# Patient Record
Sex: FEMALE | Race: WHITE | NOT HISPANIC OR LATINO | Employment: FULL TIME | URBAN - METROPOLITAN AREA
[De-identification: names, ages, dates, MRNs, and addresses within clinical notes are randomized per-mention and may not be internally consistent; named-entity substitution may affect disease eponyms.]

---

## 2024-01-24 ENCOUNTER — TELEPHONE (OUTPATIENT)
Dept: OBSTETRICS AND GYNECOLOGY | Facility: CLINIC | Age: 29
End: 2024-01-24
Payer: COMMERCIAL

## 2024-01-24 NOTE — TELEPHONE ENCOUNTER
Adele,    Cannot do today due to her work schedule. But did say she could do tomorrow if you had the availability?

## 2024-01-24 NOTE — TELEPHONE ENCOUNTER
"Adele,     Patient was open to schedule with anyone but I wanted to ask you. She is hoping to be seen sooner than first available.   \"DEAD SKIN INSIDE IN THE LIPS, OUT SIDE VAGINA IS SWOLLEN RED. PATIENT THINKS IT'S POSSIBLE lichen sclerosus \" - noted from the [facility] for patient's need for an appointment.    Please advise if you think she needs to be seen sooner or if first available is appropriate.  Thanks Dorys    "

## 2024-01-25 ENCOUNTER — OFFICE VISIT (OUTPATIENT)
Dept: OBSTETRICS AND GYNECOLOGY | Facility: CLINIC | Age: 29
End: 2024-01-25
Payer: COMMERCIAL

## 2024-01-25 VITALS
BODY MASS INDEX: 19.54 KG/M2 | SYSTOLIC BLOOD PRESSURE: 112 MMHG | DIASTOLIC BLOOD PRESSURE: 84 MMHG | HEART RATE: 100 BPM | WEIGHT: 106.2 LBS | HEIGHT: 62 IN

## 2024-01-25 DIAGNOSIS — N89.8 VAGINAL LESION: Primary | ICD-10-CM

## 2024-01-25 DIAGNOSIS — Z30.011 ENCOUNTER FOR INITIAL PRESCRIPTION OF CONTRACEPTIVE PILLS: ICD-10-CM

## 2024-01-25 RX ORDER — LIDOCAINE HYDROCHLORIDE 20 MG/ML
JELLY TOPICAL AS NEEDED
Qty: 5 G | Refills: 3 | Status: CANCELLED | OUTPATIENT
Start: 2024-01-25 | End: 2024-01-30

## 2024-01-25 RX ORDER — VALACYCLOVIR HYDROCHLORIDE 1 G/1
1000 TABLET, FILM COATED ORAL DAILY
Qty: 5 TABLET | Refills: 0 | Status: CANCELLED | OUTPATIENT
Start: 2024-01-25

## 2024-01-25 RX ORDER — VALACYCLOVIR HYDROCHLORIDE 1 G/1
1000 TABLET, FILM COATED ORAL 2 TIMES DAILY
Qty: 20 TABLET | Refills: 0 | Status: SHIPPED | OUTPATIENT
Start: 2024-01-25

## 2024-01-25 RX ORDER — LIDOCAINE HYDROCHLORIDE 20 MG/ML
JELLY TOPICAL AS NEEDED
Qty: 5 G | Refills: 3 | Status: SHIPPED | OUTPATIENT
Start: 2024-01-25 | End: 2024-01-30

## 2024-01-25 RX ORDER — NORETHINDRONE ACETATE AND ETHINYL ESTRADIOL, ETHINYL ESTRADIOL AND FERROUS FUMARATE 1MG-10(24)
1 KIT ORAL DAILY
Qty: 84 TABLET | Refills: 2 | Status: SHIPPED | OUTPATIENT
Start: 2024-01-25

## 2024-01-25 RX ORDER — CEFTRIAXONE 500 MG/1
500 INJECTION, POWDER, FOR SOLUTION INTRAMUSCULAR; INTRAVENOUS ONCE
Start: 2024-01-25 | End: 2024-01-25

## 2024-01-25 NOTE — PROGRESS NOTES
"GYN EXAM    Chief Complaint   Patient presents with    Gynecologic Exam     New patient. Patient c/o vaginal irritation since 1/19/24. Patient also c/o right pelvic pain. Patient would like to discuss birth control options.        SUBJECTIVE:     Fannie is a 29 y.o. No obstetric history on file. who presents with complaints of vaginal discharge and lesions for the last couple of days. She also complains of right sided pelvic pain for the same period of time. She has a new partner that she slept with approximately 6 days ago. She describes the discharge as yellow and thick. Denies vaginal itching, odor, fever, any change in soaps or hygiene products, or douching. She does endorse practicing safe intercourse with the use of condoms. She is also looking to get back on her birth control pills today - she has taken Lo-Loestrin in the past with good result.     History reviewed. No pertinent past medical history.     Past Surgical History:   Procedure Laterality Date    WISDOM TOOTH EXTRACTION        Review of Systems   Genitourinary:  Positive for dysuria, genital sores, pelvic pain, vaginal discharge and vaginal pain.   All other systems reviewed and are negative.      OBJECTIVE:   Vitals:    01/25/24 1049   BP: 112/84   Pulse: 100   Weight: 48.2 kg (106 lb 3.2 oz)   Height: 157.5 cm (62\")        Physical Exam  Constitutional:       General: She is awake.      Appearance: Normal appearance. She is well-developed and well-groomed.   Genitourinary:            Vaginal discharge, erythema, tenderness and ulceration present.      No vaginal bleeding.   HENT:      Head: Normocephalic and atraumatic.   Pulmonary:      Effort: Pulmonary effort is normal.   Musculoskeletal:      Cervical back: Normal range of motion.   Lymphadenopathy:      Lower Body: Right inguinal adenopathy present. Left inguinal adenopathy present.   Neurological:      General: No focal deficit present.      Mental Status: She is alert and oriented to " person, place, and time.   Skin:     General: Skin is warm and dry.   Psychiatric:         Mood and Affect: Mood normal.         Behavior: Behavior normal. Behavior is cooperative.   Vitals reviewed.       ASSESSMENT/PLAN  Diagnoses and all orders for this visit:    1. Vaginal lesion (Primary)  -     valACYclovir (Valtrex) 1000 MG tablet; Take 1 tablet by mouth 2 (Two) Times a Day.  Dispense: 20 tablet; Refill: 0  -     Lidocaine HCl gel (XYLOCAINE) 2 % gel; Apply  topically to the appropriate area as directed As Needed for Mild Pain for up to 5 days. Use a thin layer on vulva as needed for pain.  Dispense: 5 g; Refill: 3  -     Genital Culture - Swab, Vagina  -     Mycoplasma / Ureaplasma Culture - Swab, Cervix  -     NuSwab VG+ & HSV  -     cefTRIAXone (ROCEPHIN) 500 MG injection; Inject 500 mg into the appropriate muscle as directed by prescriber 1 (One) Time for 1 dose.    2. Encounter for initial prescription of contraceptive pills  -     Norethin-Eth Estrad-Fe Biphas (Lo Loestrin Fe) 1 MG-10 MCG / 10 MCG tablet; Take 1 tablet by mouth Daily.  Dispense: 84 tablet; Refill: 2      STD screen today- desires - NuSwab., encouraged use of condoms.  Vaginal cultures obtained including genital culture, NuSwab and mycoplasma/ureaplasma.   Rx sent for valtrex & lidocaine gel for primary outbreak of HSV.  Given the appearance of her vaginal discharge, I ordered a one time injection of ceftriaxone to prevent PID.   Precautions discussed.   Encouraged condoms with IC, cotton only underwear, mild or no soaps, avoidance of douching or cm steaming.   Encouraged adding probiotic daily or vaginal boric acid suppositories.   Samples of lo-loestrin given to patient and Rx sent to her pharmacy - [facility] discussed.   She originally came in to do her annual today but given her symptoms I suggested that we get her current symptoms under control prior to doing her annual.     Return if symptoms worsen or fail to improve, for annual  physical.    I spent 15 minutes caring for Fannie on this date of service. This time includes time spent by me in the following activities: preparing for the visit, reviewing tests, obtaining and/or reviewing a separately obtained history, performing a medically appropriate examination and/or evaluation, counseling and educating the patient/family/caregiver, ordering medications, tests, or procedures, referring and communicating with other health care professionals, documenting information in the medical record, independently interpreting results and communicating that information with the patient/family/caregiver, and care coordination        Adele Alvarez CNM  1/25/2024  12:47 EST

## 2024-01-26 RX ORDER — CEFTRIAXONE 500 MG/1
500 INJECTION, POWDER, FOR SOLUTION INTRAMUSCULAR; INTRAVENOUS ONCE
Status: SHIPPED | OUTPATIENT
Start: 2024-01-25

## 2024-01-26 NOTE — PATIENT INSTRUCTIONS
Rocephin 500 mg given-right ventrogluteal IM.  Patient tolerated well with no reaction.Office supply  [facility]:  8788-8267-08  LOT  [id number]  EXP  11/30/25

## 2024-01-28 RX ORDER — FLUCONAZOLE 150 MG/1
150 TABLET ORAL
Qty: 2 TABLET | Refills: 0 | Status: SHIPPED | OUTPATIENT
Start: 2024-01-28

## 2024-01-28 RX ORDER — PENICILLIN V POTASSIUM 250 MG/1
250 TABLET ORAL 4 TIMES DAILY
Qty: 28 TABLET | Refills: 0 | Status: SHIPPED | OUTPATIENT
Start: 2024-01-28 | End: 2024-02-04

## 2024-01-29 ENCOUNTER — TELEPHONE (OUTPATIENT)
Dept: OBSTETRICS AND GYNECOLOGY | Facility: CLINIC | Age: 29
End: 2024-01-29
Payer: COMMERCIAL

## 2024-01-29 LAB
A VAGINAE DNA VAG QL NAA+PROBE: ABNORMAL SCORE
BACTERIA GENITAL AEROBE CULT: ABNORMAL
BACTERIA SPEC CULT: ABNORMAL
BACTERIA SPEC CULT: ABNORMAL
BVAB2 DNA VAG QL NAA+PROBE: ABNORMAL SCORE
C ALBICANS DNA VAG QL NAA+PROBE: NEGATIVE
C GLABRATA DNA VAG QL NAA+PROBE: NEGATIVE
C TRACH DNA VAG QL NAA+PROBE: NEGATIVE
HSV1 DNA SPEC QL NAA+PROBE: POSITIVE
HSV2 DNA SPEC QL NAA+PROBE: NEGATIVE
MEGA1 DNA VAG QL NAA+PROBE: ABNORMAL SCORE
N GONORRHOEA DNA VAG QL NAA+PROBE: NEGATIVE
T VAGINALIS DNA VAG QL NAA+PROBE: NEGATIVE

## 2024-01-29 NOTE — TELEPHONE ENCOUNTER
Call to patient to discuss HSV 1 results from symptomatic swab . Reports that she is much improved since starting episodic treatment. I will continue to prescribe her antiviral and lidocaine as she needs.

## 2024-01-30 ENCOUNTER — PATIENT ROUNDING (BHMG ONLY) (OUTPATIENT)
Dept: OBSTETRICS AND GYNECOLOGY | Facility: CLINIC | Age: 29
End: 2024-01-30
Payer: COMMERCIAL

## 2024-01-30 ENCOUNTER — PATIENT MESSAGE (OUTPATIENT)
Dept: OBSTETRICS AND GYNECOLOGY | Facility: CLINIC | Age: 29
End: 2024-01-30
Payer: COMMERCIAL

## 2024-01-30 NOTE — PROGRESS NOTES
My chart message has been sent to the patient for PATIENT ROUNDING with [facility].

## 2024-01-31 ENCOUNTER — TELEPHONE (OUTPATIENT)
Dept: ORTHOPEDIC SURGERY | Facility: CLINIC | Age: 29
End: 2024-01-31
Payer: COMMERCIAL

## 2024-01-31 RX ORDER — AZITHROMYCIN 500 MG/1
1000 TABLET, FILM COATED ORAL ONCE
Qty: 2 TABLET | Refills: 0 | Status: SHIPPED | OUTPATIENT
Start: 2024-01-31 | End: 2024-01-31

## 2024-01-31 NOTE — TELEPHONE ENCOUNTER
Called and spoke to patient and stated per Adele Alvarez CNM; that she did test positive for ureasplama, this is sexually transmitted and I am sending her a one time dose of zithromax to take. I recommend is she is still partner that they be treated as well. Patient understood.

## 2024-02-01 LAB
M HOMINIS SPEC QL CULT: NEGATIVE
U UREALYTICUM SPEC QL CULT: POSITIVE

## 2024-04-24 ENCOUNTER — PATIENT MESSAGE (OUTPATIENT)
Dept: OBSTETRICS AND GYNECOLOGY | Facility: CLINIC | Age: 29
End: 2024-04-24
Payer: COMMERCIAL

## 2024-04-24 DIAGNOSIS — N89.8 VAGINAL LESION: ICD-10-CM

## 2024-04-26 RX ORDER — VALACYCLOVIR HYDROCHLORIDE 1 G/1
1000 TABLET, FILM COATED ORAL 2 TIMES DAILY
Qty: 20 TABLET | Refills: 0 | Status: SHIPPED | OUTPATIENT
Start: 2024-04-26

## 2024-04-29 NOTE — TELEPHONE ENCOUNTER
I do not know the coding. I had pt to check with her insurance about her copay. Please read and help if you can.  Thanks,  MARY Hughes

## 2024-04-29 NOTE — TELEPHONE ENCOUNTER
Discussed with patient generally we do not collect co-pays for annual exam. It depends on her insurance. Advised if any problem found that day there may be additional charges and she would be billed for co-pay.

## 2025-03-26 ENCOUNTER — INPATIENT HOSPITAL (OUTPATIENT)
Dept: URBAN - METROPOLITAN AREA HOSPITAL 107 | Facility: HOSPITAL | Age: 30
End: 2025-03-26

## 2025-03-26 DIAGNOSIS — K29.50 UNSPECIFIED CHRONIC GASTRITIS WITHOUT BLEEDING: ICD-10-CM

## 2025-03-26 DIAGNOSIS — K21.00 GASTRO-ESOPHAGEAL REFLUX DISEASE WITH ESOPHAGITIS, WITHOUT B: ICD-10-CM

## 2025-03-26 DIAGNOSIS — R10.13 EPIGASTRIC PAIN: ICD-10-CM

## 2025-03-26 PROCEDURE — 43239 EGD BIOPSY SINGLE/MULTIPLE: CPT | Performed by: INTERNAL MEDICINE
